# Patient Record
Sex: FEMALE | Race: WHITE | NOT HISPANIC OR LATINO | URBAN - METROPOLITAN AREA
[De-identification: names, ages, dates, MRNs, and addresses within clinical notes are randomized per-mention and may not be internally consistent; named-entity substitution may affect disease eponyms.]

---

## 2022-08-09 ENCOUNTER — HOSPITAL ENCOUNTER (EMERGENCY)
Facility: MEDICAL CENTER | Age: 47
End: 2022-08-10
Attending: EMERGENCY MEDICINE

## 2022-08-09 DIAGNOSIS — F15.10 METHAMPHETAMINE ABUSE (HCC): ICD-10-CM

## 2022-08-09 DIAGNOSIS — F29 PSYCHOSIS, UNSPECIFIED PSYCHOSIS TYPE (HCC): ICD-10-CM

## 2022-08-09 LAB
ALBUMIN SERPL BCP-MCNC: 4.4 G/DL (ref 3.2–4.9)
ALBUMIN/GLOB SERPL: 1.5 G/DL
ALP SERPL-CCNC: 91 U/L (ref 30–99)
ALT SERPL-CCNC: 35 U/L (ref 2–50)
ANION GAP SERPL CALC-SCNC: 11 MMOL/L (ref 7–16)
AST SERPL-CCNC: 58 U/L (ref 12–45)
BASOPHILS # BLD AUTO: 0.7 % (ref 0–1.8)
BASOPHILS # BLD: 0.06 K/UL (ref 0–0.12)
BILIRUB SERPL-MCNC: 0.3 MG/DL (ref 0.1–1.5)
BUN SERPL-MCNC: 39 MG/DL (ref 8–22)
CALCIUM SERPL-MCNC: 8.6 MG/DL (ref 8.5–10.5)
CHLORIDE SERPL-SCNC: 106 MMOL/L (ref 96–112)
CO2 SERPL-SCNC: 25 MMOL/L (ref 20–33)
CREAT SERPL-MCNC: 0.97 MG/DL (ref 0.5–1.4)
EOSINOPHIL # BLD AUTO: 0.26 K/UL (ref 0–0.51)
EOSINOPHIL NFR BLD: 3.1 % (ref 0–6.9)
ERYTHROCYTE [DISTWIDTH] IN BLOOD BY AUTOMATED COUNT: 53 FL (ref 35.9–50)
ETHANOL BLD-MCNC: <10.1 MG/DL
GFR SERPLBLD CREATININE-BSD FMLA CKD-EPI: 72 ML/MIN/1.73 M 2
GLOBULIN SER CALC-MCNC: 2.9 G/DL (ref 1.9–3.5)
GLUCOSE SERPL-MCNC: 113 MG/DL (ref 65–99)
HCT VFR BLD AUTO: 38 % (ref 37–47)
HGB BLD-MCNC: 12.4 G/DL (ref 12–16)
IMM GRANULOCYTES # BLD AUTO: 0.03 K/UL (ref 0–0.11)
IMM GRANULOCYTES NFR BLD AUTO: 0.4 % (ref 0–0.9)
LYMPHOCYTES # BLD AUTO: 1.59 K/UL (ref 1–4.8)
LYMPHOCYTES NFR BLD: 19 % (ref 22–41)
MCH RBC QN AUTO: 33.1 PG (ref 27–33)
MCHC RBC AUTO-ENTMCNC: 32.6 G/DL (ref 33.6–35)
MCV RBC AUTO: 101.3 FL (ref 81.4–97.8)
MONOCYTES # BLD AUTO: 0.53 K/UL (ref 0–0.85)
MONOCYTES NFR BLD AUTO: 6.3 % (ref 0–13.4)
NEUTROPHILS # BLD AUTO: 5.92 K/UL (ref 2–7.15)
NEUTROPHILS NFR BLD: 70.5 % (ref 44–72)
NRBC # BLD AUTO: 0 K/UL
NRBC BLD-RTO: 0 /100 WBC
PLATELET # BLD AUTO: 256 K/UL (ref 164–446)
PMV BLD AUTO: 9.8 FL (ref 9–12.9)
POTASSIUM SERPL-SCNC: 4.3 MMOL/L (ref 3.6–5.5)
PROT SERPL-MCNC: 7.3 G/DL (ref 6–8.2)
RBC # BLD AUTO: 3.75 M/UL (ref 4.2–5.4)
SODIUM SERPL-SCNC: 142 MMOL/L (ref 135–145)
WBC # BLD AUTO: 8.4 K/UL (ref 4.8–10.8)

## 2022-08-09 PROCEDURE — 84703 CHORIONIC GONADOTROPIN ASSAY: CPT

## 2022-08-09 PROCEDURE — A9270 NON-COVERED ITEM OR SERVICE: HCPCS | Performed by: EMERGENCY MEDICINE

## 2022-08-09 PROCEDURE — 82077 ASSAY SPEC XCP UR&BREATH IA: CPT

## 2022-08-09 PROCEDURE — 36415 COLL VENOUS BLD VENIPUNCTURE: CPT

## 2022-08-09 PROCEDURE — 85025 COMPLETE CBC W/AUTO DIFF WBC: CPT

## 2022-08-09 PROCEDURE — 700102 HCHG RX REV CODE 250 W/ 637 OVERRIDE(OP): Performed by: EMERGENCY MEDICINE

## 2022-08-09 PROCEDURE — 99285 EMERGENCY DEPT VISIT HI MDM: CPT

## 2022-08-09 PROCEDURE — 700111 HCHG RX REV CODE 636 W/ 250 OVERRIDE (IP): Performed by: EMERGENCY MEDICINE

## 2022-08-09 PROCEDURE — 700111 HCHG RX REV CODE 636 W/ 250 OVERRIDE (IP)

## 2022-08-09 PROCEDURE — 93005 ELECTROCARDIOGRAM TRACING: CPT | Performed by: EMERGENCY MEDICINE

## 2022-08-09 PROCEDURE — 90791 PSYCH DIAGNOSTIC EVALUATION: CPT

## 2022-08-09 PROCEDURE — 96372 THER/PROPH/DIAG INJ SC/IM: CPT

## 2022-08-09 PROCEDURE — 80053 COMPREHEN METABOLIC PANEL: CPT

## 2022-08-09 RX ORDER — ESCITALOPRAM OXALATE 10 MG/1
10 TABLET ORAL DAILY
Status: DISCONTINUED | OUTPATIENT
Start: 2022-08-09 | End: 2022-08-10 | Stop reason: HOSPADM

## 2022-08-09 RX ORDER — MIDAZOLAM HYDROCHLORIDE 5 MG/ML
2 INJECTION INTRAMUSCULAR; INTRAVENOUS ONCE
Status: DISCONTINUED | OUTPATIENT
Start: 2022-08-09 | End: 2022-08-09

## 2022-08-09 RX ORDER — LORAZEPAM 2 MG/1
2 TABLET ORAL ONCE
Status: DISPENSED | OUTPATIENT
Start: 2022-08-09 | End: 2022-08-10

## 2022-08-09 RX ORDER — DIPHENHYDRAMINE HYDROCHLORIDE 50 MG/ML
50 INJECTION INTRAMUSCULAR; INTRAVENOUS ONCE
Status: COMPLETED | OUTPATIENT
Start: 2022-08-09 | End: 2022-08-09

## 2022-08-09 RX ORDER — HALOPERIDOL 2 MG/1
5 TABLET ORAL ONCE
Status: ACTIVE | OUTPATIENT
Start: 2022-08-09 | End: 2022-08-10

## 2022-08-09 RX ORDER — MIDAZOLAM HYDROCHLORIDE 1 MG/ML
INJECTION INTRAMUSCULAR; INTRAVENOUS
Status: COMPLETED
Start: 2022-08-09 | End: 2022-08-09

## 2022-08-09 RX ORDER — LORAZEPAM 2 MG/ML
2 INJECTION INTRAMUSCULAR ONCE
Status: DISCONTINUED | OUTPATIENT
Start: 2022-08-09 | End: 2022-08-09

## 2022-08-09 RX ORDER — HALOPERIDOL 5 MG/ML
5 INJECTION INTRAMUSCULAR ONCE
Status: COMPLETED | OUTPATIENT
Start: 2022-08-09 | End: 2022-08-09

## 2022-08-09 RX ORDER — MIDAZOLAM HYDROCHLORIDE 5 MG/ML
2.5 INJECTION INTRAMUSCULAR; INTRAVENOUS ONCE
Status: COMPLETED | OUTPATIENT
Start: 2022-08-09 | End: 2022-08-09

## 2022-08-09 RX ORDER — HALOPERIDOL 5 MG/ML
INJECTION INTRAMUSCULAR
Status: COMPLETED
Start: 2022-08-09 | End: 2022-08-09

## 2022-08-09 RX ADMIN — ESCITALOPRAM OXALATE 10 MG: 10 TABLET ORAL at 15:33

## 2022-08-09 RX ADMIN — MIDAZOLAM HYDROCHLORIDE 2.5 MG: 5 INJECTION, SOLUTION INTRAMUSCULAR; INTRAVENOUS at 19:02

## 2022-08-09 RX ADMIN — DIPHENHYDRAMINE HYDROCHLORIDE 50 MG: 50 INJECTION INTRAMUSCULAR; INTRAVENOUS at 19:03

## 2022-08-09 RX ADMIN — HALOPERIDOL 5 MG: 5 INJECTION INTRAMUSCULAR at 19:02

## 2022-08-09 RX ADMIN — HALOPERIDOL LACTATE 5 MG: 5 INJECTION, SOLUTION INTRAMUSCULAR at 19:02

## 2022-08-09 ASSESSMENT — LIFESTYLE VARIABLES
HOW MANY TIMES IN THE PAST YEAR HAVE YOU HAD 5 OR MORE DRINKS IN A DAY: 0
EVER HAD A DRINK FIRST THING IN THE MORNING TO STEADY YOUR NERVES TO GET RID OF A HANGOVER: NO
HAVE PEOPLE ANNOYED YOU BY CRITICIZING YOUR DRINKING: NO
HAVE YOU EVER FELT YOU SHOULD CUT DOWN ON YOUR DRINKING: NO
TOTAL SCORE: 0
EVER FELT BAD OR GUILTY ABOUT YOUR DRINKING: NO
TOTAL SCORE: 0
CONSUMPTION TOTAL: NEGATIVE
AVERAGE NUMBER OF DAYS PER WEEK YOU HAVE A DRINK CONTAINING ALCOHOL: 0
TOTAL SCORE: 0
ON A TYPICAL DAY WHEN YOU DRINK ALCOHOL HOW MANY DRINKS DO YOU HAVE: 0
DO YOU DRINK ALCOHOL: NO

## 2022-08-09 NOTE — ED NOTES
Pt agreed to take lexapro. Also told RN that reading magazine and drinking coffee keeps her relaxed.  [place] provided to pt for reading and relaxation.

## 2022-08-09 NOTE — CONSULTS
[facility]   TRIAGE ASSESSMENT    Name: Emilia Cordova  MRN: [id number]  : 1975  Age: 47 y.o.  Date of assessment: 2022  PCP: No primary care provider on file.  Persons in attendance: Patient  Patient Location: [facility]    CHIEF COMPLAINT/PRESENTING ISSUE (as stated by patient): Pt is a 46 y/o female BIB [employer] on a legal hold after she was found in a car that did not belong to her; A & O x1; disoriented to time, place, situation and unable to care for herself. Abnormal behaviors noted. Pt is delusional; has pressured speech; flight of ideas; tangential. Unable to complete most of behavioral health consult due to psychosis. Denies SI/HI. Pt standing in her room soaking paper towels with water; took socks off and seen stuffing the wet paper towels inside of the socks and placing socks on hospital bed. Admits to methamphetamine and heroin use. Pt states she has been hospitalized several times. Pt states she has lived in [place] since ; unable to verbalize current living situation, etc due to psychosis. Diagnostic alcohol <10.1 Findings discussed with ERP who agrees pt needs to transfer to an inpatient psychiatric facility for further evaluation and stabilization. Inpatient referrals to be sent to psychiatric facilities. Outpatient psychiatric referral will be faxed once PAR is completed.   Chief Complaint   Patient presents with   • Legal 2000     Placed on legal hold by [employer] for concern that pt is not able to care for herself, pt was found in a car that did not belong to her and oriented only to self per [employer], [employer] noted that pt has not eating or drinking water recently   • Psych Eval     Pt has abnormal behavior, easily distracted during conversation by unrelated thoughts, pt is redirectable and follows commands, admits to meth use this AM, unknown psych or medical hx,         CURRENT LIVING SITUATION/SOCIAL SUPPORT/FINANCIAL RESOURCES: Pt states she has lived in [place]  since 2011; unable to verbalize current living situation, etc due to psychosis.     BEHAVIORAL HEALTH/SUBSTANCE USE TREATMENT HISTORY  Does patient/parent report a history of prior behavioral health/substance use treatment for patient?   Pt states she has been hospitalized several times.  No history in EMR.    SAFETY ASSESSMENT - SELF  Does patient acknowledge current or past symptoms of dangerousness to self or is previous history noted? no  Does parent/significant other report patient has current or past symptoms of dangerousness to self? N\A  Does presenting problem suggest symptoms of dangerousness to self? No; denies SI.     SAFETY ASSESSMENT - OTHERS  Does patient acknowledge current or past symptoms of aggressive behavior or risk to others or is previous history noted? no  Does parent/significant other report patient has current or past symptoms of aggressive behavior or risk to others?  N\A  Does presenting problem suggest symptoms of dangerousness to others? No; denies HI.     LEGAL HISTORY  Does patient acknowledge history of arrest/nursing home/senior living or is previous history noted? Unable to assess due to psychosis.     Crisis Safety Plan completed and copy given to patient? N\A    ABUSE/NEGLECT SCREENING  Does patient report feeling “unsafe” in his/her home, or afraid of anyone?  Unable to assess due to psychosis.   Does patient report any history of physical, sexual, or emotional abuse?  Unable to assess due to psychosis.   Does parent or significant other report any of the above? N\A  Is there evidence of neglect by self?  yes  Is there evidence of neglect by a caregiver? N/A  Does the patient/parent report any history of CPS/APS/police involvement related to suspected abuse/neglect or domestic violence? Unable to assess due to psychosis.   Based on the information provided during the current assessment, is a mandated report of suspected abuse/neglect being made?  No    SUBSTANCE USE SCREENING  Yes:  Meño all  "substances used in the past 30 days:      Last Use Amount   []   Alcohol     []   Marijuana     [x]   Heroin Not specified Not specified   []   Prescription Opioids  (used without prescription, for    recreation, or in excess of prescribed amount)     []   Other Prescription  (used without prescription, for    recreation, or in excess of prescribed amount)     []   Cocaine      [x]   Methamphetamine Not specified Not specified   []   \"\" drugs (ectasy, MDMA)     []   Other substances        UDS results: results pending  Breathalyzer results: <10.1          MENTAL STATUS   Participation: Verbally monopolizing  Grooming: Disheveled  Orientation: Disoriented to: place, time, situation and Evidence of delusions present  Behavior: Hyperactive and Unusual behaviors noted  Eye contact: Intense  Mood: Manic  Affect: Constricted  Thought process: Tangential and Flight of ideas  Thought content: Preoccupation and Evidence of delusion  Speech: Hypertalkative  Perception: Derealization  Memory:  Poor memory for chronology of events  Insight: Poor  Judgment:  Poor  Other:    Collateral information:   Source:  [] Significant other present in person:   [] Significant other by telephone  [] [facility]   [x] [facility] Nursing Staff  [] [facility]  [x] Other: ERP     [] Unable to complete full assessment due to:  [] Acute intoxication  [] Patient declined to participate/engage  [] Patient verbally unresponsive  [x] Significant cognitive deficits  [x] Significant perceptual distortions or behavioral disorganization  [] Other:      CLINICAL IMPRESSIONS:  Primary:  Psychosis  Secondary:  Methamphetamine abuse       IDENTIFIED NEEDS/PLAN:  [Trigger DISPOSITION list for any items marked]    []  Imminent safety risk - self [] Imminent safety risk - others   []  Acute substance withdrawal [x]  Psychosis/Impaired reality testing   [x]  Mood/anxiety [x]  Substance use/Addictive behavior   [x]  Maladaptive behaviro []  " Parent/child conflict   []  Family/Couples conflict []  Biomedical   [x]  Housing []  Financial   []   Legal  Occupational/Educational   []  Domestic violence []  Other:     Recommended Plan of Care:  Actively being addressed by Legal Hold and [facility] and Refer to inpatient psychiatric facilities. 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 Denies SI/HI. Pt is delusional; pressured speech; flight of ideas; tangential. Findings discussed with ERP who agrees pt needs to transfer to an inpatient psychiatric facility for further evaluation and stabilization. Inpatient referrals to be sent to psychiatric facilities. Outpatient psychiatric referral will be faxed once PAR is completed.   *Telesitter may not be utilized for moderate or high risk patients    Has the Recommended Plan of Care/Level of Observation been reviewed with the patient's assigned nurse? Yes; no risk on [place]; psychosis; q15min checks    Does patient/parent or guardian express agreement with the above plan? Pt unable to verbalize agreement with plan of care due to psychosis.       Referral appointment(s) scheduled? N\A    Alert team only:   I have discussed findings and recommendations with Dr. Aguilar who is in agreement with these recommendations.     Referral information sent to the following outpatient community providers : Will send appropriate referrals after PAR is completed.     Referral information sent to the following inpatient community providers : inpatient psychiatric facilities    If applicable : Referred  to  Alert Team for legal hold follow up at (time): 8/9/2022 @ 1050      Nela Oconnor R.N.  8/9/2022

## 2022-08-09 NOTE — ED TRIAGE NOTES
"Chief Complaint   Patient presents with   • Legal 2000     Placed on legal hold by [employer] for concern that pt is not able to care for herself, pt was found in a car that did not belong to her and oriented only to self per [employer], [employer] noted that pt has not eating or drinking water recently   • Psych Eval     Pt has abnormal behavior, easily distracted during conversation by unrelated thoughts, pt is redirectable and follows commands, admits to meth use this AM, unknown psych or medical hx,      Pt BIB EMS for above complaints, VSS on RA, GCS 14, NAD. Pt denies any trauma. Pt was near private apartment where she lives per EMS.    Denies all s/sx of covid, denies recent travel, denies fevers.    /64   Pulse 94   Temp 36.9 °C (98.4 °F) (Temporal)   Resp 16   Ht 1.702 m (5' 7\")   Wt 72.6 kg (160 lb)   SpO2 96%   BMI 25.06 kg/m²     "

## 2022-08-09 NOTE — ED NOTES
Pt noted restless at bedside pulled out multiple paper towel and toilet paper and grab wash clothes and starting to ripped them, spent long time in the sink washing them. Pt informed and redirected multiple times to stop and to return to her room.  Refused both ativan and haldol. Pt asking for lexapro states its the only medication that works for her. erp made aware of the lexapro
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
## 2022-08-09 NOTE — ED NOTES
Unable to assess, pt unable to participate in interview. No pharmacy recorded in profile. No outside encounters listed either.

## 2022-08-10 VITALS
WEIGHT: 160 LBS | DIASTOLIC BLOOD PRESSURE: 62 MMHG | SYSTOLIC BLOOD PRESSURE: 102 MMHG | TEMPERATURE: 98 F | BODY MASS INDEX: 25.11 KG/M2 | HEART RATE: 66 BPM | HEIGHT: 67 IN | RESPIRATION RATE: 16 BRPM | OXYGEN SATURATION: 98 %

## 2022-08-10 LAB
AMPHET UR QL SCN: POSITIVE
BARBITURATES UR QL SCN: NEGATIVE
BENZODIAZ UR QL SCN: POSITIVE
BZE UR QL SCN: NEGATIVE
CANNABINOIDS UR QL SCN: NEGATIVE
HCG SERPL QL: NEGATIVE
METHADONE UR QL SCN: NEGATIVE
OPIATES UR QL SCN: NEGATIVE
OXYCODONE UR QL SCN: NEGATIVE
PCP UR QL SCN: NEGATIVE
PROPOXYPH UR QL SCN: NEGATIVE
SARS-COV+SARS-COV-2 AG RESP QL IA.RAPID: NOTDETECTED
SPECIMEN SOURCE: NORMAL

## 2022-08-10 PROCEDURE — 700102 HCHG RX REV CODE 250 W/ 637 OVERRIDE(OP): Performed by: EMERGENCY MEDICINE

## 2022-08-10 PROCEDURE — 87426 SARSCOV CORONAVIRUS AG IA: CPT

## 2022-08-10 PROCEDURE — 80307 DRUG TEST PRSMV CHEM ANLYZR: CPT

## 2022-08-10 PROCEDURE — A9270 NON-COVERED ITEM OR SERVICE: HCPCS | Performed by: EMERGENCY MEDICINE

## 2022-08-10 RX ADMIN — ESCITALOPRAM OXALATE 10 MG: 10 TABLET ORAL at 05:09

## 2022-08-10 NOTE — ED NOTES
Phone report to [facility]. EMS at bedside to take patient. Transfer packet, all belongings, and discharge papers sent with patient.

## 2022-08-10 NOTE — ED NOTES
Patient calm, sitting in room, reading magazine.  Educated on need for urine sample collection, verbalized understanding, reports unable to provide at this time. Patient drinking apple juice.    Patient on legal hold for SI, room previously stripped of all dangerous items, room safety checklist in use. Pt in hospital gown and personal items previously removed. Legal hold in patient chart. No self harm discussed with pt, states will not harm self here.      Q15 minute observation with visual monitoring by Trained Personnel who remain in line of site of the patient with the intent to ensure safety of the patient and minimize the risk of suicide.

## 2022-08-10 NOTE — ED NOTES
Patient sleeping. No apparent acute distress noted. Active chest rise observed with equal rise and fall of chest wall. No agitation noted. No behavioral pain indicators.

## 2022-08-10 NOTE — ED NOTES
Pt alert and awake at this time. Patient resting in [facility]  in no acute distress. Active chest rise noted and breathing is non-labored. No agitation noted. No behavioral pain indicators.     Sheets changed and pt given clean gown for comfort. Pt give water as per request. Pt tolerating PO without any difficulty.

## 2022-08-10 NOTE — PROGRESS NOTES
"ED Provider Progress Note    Date of Service: 08/10/22    Interval History  Patient is here for acute psychosis prior secondary to methamphetamine use.  The patient is waiting for transfer to a local psychiatric facility for further evaluation and management of her psychosis and paranoid behavior.  Throughout the evening, she came more aggressively psychotic and aggressive and received Haldol 5 mg, Benadryl 50 mg, Ativan 2 mg and since then the patient is resting comfortably throughout the morning.  She became extremely agitated throughout the evening.  At 10:00 in the morning, the patient did sit down on the ground was upset that she did not have breakfast as of yet.  She was redirected back to bed without any conflict or trauma    Physical Exam  BP (!) 98/56   Pulse 60   Temp 36.7 °C (98 °F) (Temporal)   Resp 18   Ht 1.702 m (5' 7\")   Wt 72.6 kg (160 lb)   SpO2 99%   BMI 25.06 kg/m² .    Constitutional: Awake and alert. Nontoxic  HENT:  Grossly normal  Eyes: Grossly normal  Neck: Normal range of motion  Cardiovascular: Normal heart rate   Thorax & Lungs: No respiratory distress  Abdomen: Nontender  Skin:  No pathologic rash.   Extremities: Well perfused  Psychiatric: Anxious    Labs  Results for orders placed or performed during the hospital encounter of 08/09/22   CBC WITH DIFFERENTIAL   Result Value Ref Range    WBC 8.4 4.8 - 10.8 K/uL    RBC 3.75 (L) 4.20 - 5.40 M/uL    Hemoglobin 12.4 12.0 - 16.0 g/dL    Hematocrit 38.0 37.0 - 47.0 %    .3 (H) 81.4 - 97.8 fL    MCH 33.1 (H) 27.0 - 33.0 pg    MCHC 32.6 (L) 33.6 - 35.0 g/dL    RDW 53.0 (H) 35.9 - 50.0 fL    Platelet Count 256 164 - 446 K/uL    MPV 9.8 9.0 - 12.9 fL    Neutrophils-Polys 70.50 44.00 - 72.00 %    Lymphocytes 19.00 (L) 22.00 - 41.00 %    Monocytes 6.30 0.00 - 13.40 %    Eosinophils 3.10 0.00 - 6.90 %    Basophils 0.70 0.00 - 1.80 %    Immature Granulocytes 0.40 0.00 - 0.90 %    Nucleated RBC 0.00 /100 WBC    Neutrophils (Absolute) 5.92 " 2.00 - 7.15 K/uL    Lymphs (Absolute) 1.59 1.00 - 4.80 K/uL    Monos (Absolute) 0.53 0.00 - 0.85 K/uL    Eos (Absolute) 0.26 0.00 - 0.51 K/uL    Baso (Absolute) 0.06 0.00 - 0.12 K/uL    Immature Granulocytes (abs) 0.03 0.00 - 0.11 K/uL    NRBC (Absolute) 0.00 K/uL   COMP METABOLIC PANEL   Result Value Ref Range    Sodium 142 135 - 145 mmol/L    Potassium 4.3 3.6 - 5.5 mmol/L    Chloride 106 96 - 112 mmol/L    Co2 25 20 - 33 mmol/L    Anion Gap 11.0 7.0 - 16.0    Glucose 113 (H) 65 - 99 mg/dL    Bun 39 (H) 8 - 22 mg/dL    Creatinine 0.97 0.50 - 1.40 mg/dL    Calcium 8.6 8.5 - 10.5 mg/dL    AST(SGOT) 58 (H) 12 - 45 U/L    ALT(SGPT) 35 2 - 50 U/L    Alkaline Phosphatase 91 30 - 99 U/L    Total Bilirubin 0.3 0.1 - 1.5 mg/dL    Albumin 4.4 3.2 - 4.9 g/dL    Total Protein 7.3 6.0 - 8.2 g/dL    Globulin 2.9 1.9 - 3.5 g/dL    A-G Ratio 1.5 g/dL   DIAGNOSTIC ALCOHOL   Result Value Ref Range    Diagnostic Alcohol <10.1 <10.1 mg/dL   URINE DRUG SCREEN   Result Value Ref Range    Amphetamines Urine Positive (A) Negative    Barbiturates Negative Negative    Benzodiazepines Positive (A) Negative    Cocaine Metabolite Negative Negative    Methadone Negative Negative    Opiates Negative Negative    Oxycodone Negative Negative    Phencyclidine -Pcp Negative Negative    Propoxyphene Negative Negative    Cannabinoid Metab Negative Negative   ESTIMATED GFR   Result Value Ref Range    GFR (CKD-EPI) 72 >60 mL/min/1.73 m 2       Radiology  No orders to display       Problem List  1.  Acute psychosis  2.  Methamphetamine abuse  3.  Agitation      Electronically signed by: Gab Goldsmith D.O., 8/10/2022 9:39 AM

## 2022-08-10 NOTE — ED NOTES
Patient voided on the floor, became agitated in room when asked to clean up after herself.  Patient yelling at staff, charged at RN, security at patient side.  ERP notifed, unable to deescalate patient with non pharmacological measures despite multiple attemps.  Order received to medicate patient, patient medicated with security at bedside.  Patient VS stable, SpO2 97%.

## 2022-08-10 NOTE — ED NOTES
Reviewed medications and allergies with the patient. Preferred pharmacy is [facility] in [place], does not have active medications.

## 2022-08-10 NOTE — ED NOTES
Pt ambulated to the restroom and back to room. Urine sample provided at this time. Specimen sent to lab as per order.    Pt calm and cooperative. No agitation currently noted.

## 2022-08-10 NOTE — ED NOTES
Rounded on patient. Patient sleeping at this time. No signs of distress noted. Equal chest rise and fall with non-labored breathing. No further needs at this time. Lights dimmed for comfort.

## 2022-08-10 NOTE — ED NOTES
Patient's home medications have been reviewed by the pharmacy team.     History reviewed. No pertinent past medical history.    Patient's Medications    No medications on file          Unable to completed medication history    No recommendations at this time. Defer to psychiatry.     Migdalia Mcnamara Pharm.D., BCPS

## 2022-08-10 NOTE — ED NOTES
Patient continues to sleep. No signs of distress noted and breathing is non-labored. Equal chest rise and fall. No further needs at this time.

## 2022-08-10 NOTE — ED NOTES
Patient still sleeping in [facility]. No acute distress. Active chest rise noted with non-labored breathing. No agitation noted. No behavioral pain indicators.

## 2022-08-10 NOTE — ED NOTES
Rounded on patient. Patient remains asleep. No signs of distress noted. Equal chest rise and fall. No further needs at this time. No agitation noted. No behavioral pain indicators.

## 2022-08-10 NOTE — DISCHARGE SUMMARY
"  ED Observation Discharge Summary      Patient:Emilia Cordova  Patient : 1975  Patient MRN: [id number]  Patient PCP: No primary care provider on file.    Admit Date: 2022  Discharge Date and Time: 08/10/22 1:52 PM  Discharge Diagnosis: Agitation, psychosis, methamphetamine abuse   Discharge Attending: Gab Goldsmith D.O.  Discharge Service: ED Observation    ED Course  Emilia is a 47 y.o. female who was evaluated at [facility] for agitation and psychosis.  The patient was found to have methamphetamine in her system, she was extremely agitated and was placed on legal .  She was evaluated by [facility] individual here and deemed psychiatric risk thereforethe legal 2000 was certified.  The patient did have bouts of agitation he required medications.  She was reevaluated prior to discharge, she was called, resting and no longer was extremely agitated when she was still paranoid.      Discharge Exam:  /62   Pulse 66   Temp 36.7 °C (98 °F) (Temporal)   Resp 16   Ht 1.702 m (5' 7\")   Wt 72.6 kg (160 lb)   SpO2 98%   BMI 25.06 kg/m² .    Constitutional: Awake and alert. Nontoxic  HENT:  Grossly normal  Eyes: Grossly normal  Neck: Normal range of motion  Cardiovascular: Normal heart rate   Thorax & Lungs: No respiratory distress  Abdomen: Nontender  Skin:  No pathologic rash.   Extremities: Well perfused  Psychiatric: Affect normal    Labs  Results for orders placed or performed during the hospital encounter of 22   CBC WITH DIFFERENTIAL   Result Value Ref Range    WBC 8.4 4.8 - 10.8 K/uL    RBC 3.75 (L) 4.20 - 5.40 M/uL    Hemoglobin 12.4 12.0 - 16.0 g/dL    Hematocrit 38.0 37.0 - 47.0 %    .3 (H) 81.4 - 97.8 fL    MCH 33.1 (H) 27.0 - 33.0 pg    MCHC 32.6 (L) 33.6 - 35.0 g/dL    RDW 53.0 (H) 35.9 - 50.0 fL    Platelet Count 256 164 - 446 K/uL    MPV 9.8 9.0 - 12.9 fL    Neutrophils-Polys 70.50 44.00 - 72.00 %    Lymphocytes 19.00 (L) 22.00 - 41.00 %    " Monocytes 6.30 0.00 - 13.40 %    Eosinophils 3.10 0.00 - 6.90 %    Basophils 0.70 0.00 - 1.80 %    Immature Granulocytes 0.40 0.00 - 0.90 %    Nucleated RBC 0.00 /100 WBC    Neutrophils (Absolute) 5.92 2.00 - 7.15 K/uL    Lymphs (Absolute) 1.59 1.00 - 4.80 K/uL    Monos (Absolute) 0.53 0.00 - 0.85 K/uL    Eos (Absolute) 0.26 0.00 - 0.51 K/uL    Baso (Absolute) 0.06 0.00 - 0.12 K/uL    Immature Granulocytes (abs) 0.03 0.00 - 0.11 K/uL    NRBC (Absolute) 0.00 K/uL   COMP METABOLIC PANEL   Result Value Ref Range    Sodium 142 135 - 145 mmol/L    Potassium 4.3 3.6 - 5.5 mmol/L    Chloride 106 96 - 112 mmol/L    Co2 25 20 - 33 mmol/L    Anion Gap 11.0 7.0 - 16.0    Glucose 113 (H) 65 - 99 mg/dL    Bun 39 (H) 8 - 22 mg/dL    Creatinine 0.97 0.50 - 1.40 mg/dL    Calcium 8.6 8.5 - 10.5 mg/dL    AST(SGOT) 58 (H) 12 - 45 U/L    ALT(SGPT) 35 2 - 50 U/L    Alkaline Phosphatase 91 30 - 99 U/L    Total Bilirubin 0.3 0.1 - 1.5 mg/dL    Albumin 4.4 3.2 - 4.9 g/dL    Total Protein 7.3 6.0 - 8.2 g/dL    Globulin 2.9 1.9 - 3.5 g/dL    A-G Ratio 1.5 g/dL   DIAGNOSTIC ALCOHOL   Result Value Ref Range    Diagnostic Alcohol <10.1 <10.1 mg/dL   URINE DRUG SCREEN   Result Value Ref Range    Amphetamines Urine Positive (A) Negative    Barbiturates Negative Negative    Benzodiazepines Positive (A) Negative    Cocaine Metabolite Negative Negative    Methadone Negative Negative    Opiates Negative Negative    Oxycodone Negative Negative    Phencyclidine -Pcp Negative Negative    Propoxyphene Negative Negative    Cannabinoid Metab Negative Negative   ESTIMATED GFR   Result Value Ref Range    GFR (CKD-EPI) 72 >60 mL/min/1.73 m 2   BETA-HCG QUALITATIVE SERUM   Result Value Ref Range    Beta-Hcg Qualitative Serum Negative Negative   SARS-COV Antigen TURNER   Result Value Ref Range    SARS-CoV-2 Source Nasal Swab     SARS-COV ANTIGEN TURNER NotDetected NotDetected         Disposition: Discharged to psychiatric facility    Discharge Condition:  Stable    Electronically signed by: Gab Goldsmith D.O., 8/10/2022 1:52 PM

## 2022-08-10 NOTE — ED NOTES
Patient sleeping in gurney. No acute distress. Active chest rise noted and breathing is non-labored. No agitation noted. No behavioral pain indicators.

## 2022-08-10 NOTE — ED NOTES
Handoff report received from Danay OTTO for continuity of care.    [place] No acute distress. Breathing is non-labored with active chest rise noted. No agitation noted. No behavioral pain indicators.

## 2022-08-10 NOTE — ED NOTES
Pt medicated as per MAR. Pt given warm blanket for comfort. Patient is in no acute distress. Active chest rise noted. No agitation noted. No behavioral pain indicators.

## 2022-08-10 NOTE — ED NOTES
Rounded on patient. Patient still sleeping in [facility] in no apparent distress. Breathing is non-labored with equal chest rise and fall. No further needs at this time.

## 2022-08-10 NOTE — ED NOTES
Pt sleeping in gurney. No apparent distress noted at this time. Breathing is non-labored with equal rise and fall of chest wall.

## 2022-08-10 NOTE — ED NOTES
Patient sleeping. No acute distress. Active chest rise noted and breathing is non-labored. No agitation noted. No behavioral pain indicators.

## 2022-08-10 NOTE — ED NOTES
Patient remains asleep. No acute distress. Active chest rise noted and breathing is non-labored. No agitation noted. No behavioral pain indicators.